# Patient Record
Sex: MALE | Race: WHITE | NOT HISPANIC OR LATINO | Employment: STUDENT | URBAN - METROPOLITAN AREA
[De-identification: names, ages, dates, MRNs, and addresses within clinical notes are randomized per-mention and may not be internally consistent; named-entity substitution may affect disease eponyms.]

---

## 2023-05-12 PROBLEM — J04.0 LARYNGITIS: Status: ACTIVE | Noted: 2023-05-12

## 2023-05-12 PROBLEM — J10.1 INFLUENZA DUE TO INFLUENZA VIRUS, TYPE B: Status: ACTIVE | Noted: 2023-05-12

## 2023-05-12 PROBLEM — J35.1 HYPERTROPHY OF TONSILS: Status: ACTIVE | Noted: 2023-05-12

## 2023-05-12 PROBLEM — G47.30 SLEEP DISORDER BREATHING: Status: ACTIVE | Noted: 2023-05-12

## 2023-05-12 PROBLEM — H61.21 IMPACTED CERUMEN OF RIGHT EAR: Status: ACTIVE | Noted: 2023-05-12

## 2023-05-12 PROBLEM — M89.8X1 PAIN IN SCAPULA: Status: ACTIVE | Noted: 2023-05-12

## 2023-05-12 PROBLEM — M89.8X6 MASS OF TIBIA: Status: ACTIVE | Noted: 2023-05-12

## 2023-05-12 PROBLEM — M54.50 LOW BACK PAIN: Status: ACTIVE | Noted: 2023-05-12

## 2023-05-12 PROBLEM — H66.001 NON-RECURRENT ACUTE SUPPURATIVE OTITIS MEDIA OF RIGHT EAR WITHOUT SPONTANEOUS RUPTURE OF TYMPANIC MEMBRANE: Status: ACTIVE | Noted: 2023-05-12

## 2023-05-12 PROBLEM — K21.00 GASTROESOPHAGEAL REFLUX DISEASE WITH ESOPHAGITIS: Status: ACTIVE | Noted: 2023-05-12

## 2023-05-12 PROBLEM — D49.89 NEOPLASM OF LEG: Status: ACTIVE | Noted: 2023-05-12

## 2023-05-12 PROBLEM — M62.838 MUSCLE SPASM: Status: ACTIVE | Noted: 2023-05-12

## 2023-05-12 PROBLEM — J03.90 ACUTE TONSILLITIS: Status: ACTIVE | Noted: 2023-05-12

## 2023-05-12 PROBLEM — R59.9 LYMPH NODE ENLARGEMENT: Status: ACTIVE | Noted: 2023-05-12

## 2023-05-12 PROBLEM — H65.02 ACUTE SEROUS OTITIS MEDIA OF LEFT EAR: Status: ACTIVE | Noted: 2023-05-12

## 2023-05-12 PROBLEM — E78.00 HYPERCHOLESTEROLEMIA: Status: ACTIVE | Noted: 2023-05-12

## 2023-05-12 PROBLEM — F40.10 SOCIAL ANXIETY DISORDER OF CHILDHOOD: Status: ACTIVE | Noted: 2023-05-12

## 2023-05-12 PROBLEM — H60.311 ACUTE DIFFUSE OTITIS EXTERNA OF RIGHT EAR: Status: ACTIVE | Noted: 2023-05-12

## 2023-05-12 PROBLEM — F90.0 ADHD, PREDOMINANTLY INATTENTIVE TYPE: Status: ACTIVE | Noted: 2023-05-12

## 2023-05-12 PROBLEM — R15.9 BOWEL INCONTINENCE: Status: ACTIVE | Noted: 2023-05-12

## 2023-05-12 PROBLEM — R63.5 ABNORMAL WEIGHT GAIN: Status: ACTIVE | Noted: 2023-05-12

## 2023-05-12 PROBLEM — R05.9 COUGH: Status: ACTIVE | Noted: 2023-05-12

## 2023-05-12 PROBLEM — G89.29 MID BACK PAIN, CHRONIC: Status: ACTIVE | Noted: 2023-05-12

## 2023-05-12 PROBLEM — G47.33 OBSTRUCTIVE SLEEP APNEA SYNDROME: Status: ACTIVE | Noted: 2023-05-12

## 2023-05-12 PROBLEM — R50.9 FEVER: Status: ACTIVE | Noted: 2023-05-12

## 2023-05-12 PROBLEM — J01.00 ACUTE NON-RECURRENT MAXILLARY SINUSITIS: Status: ACTIVE | Noted: 2023-05-12

## 2023-05-12 PROBLEM — J18.9 LLL PNEUMONIA: Status: ACTIVE | Noted: 2023-05-12

## 2023-05-12 PROBLEM — H66.009 ACUTE SUPPURATIVE OTITIS MEDIA: Status: ACTIVE | Noted: 2023-05-12

## 2023-05-12 PROBLEM — L01.02 PUSTULAR FOLLICULITIS: Status: ACTIVE | Noted: 2023-05-12

## 2023-05-12 PROBLEM — F41.1 GENERALIZED ANXIETY DISORDER: Status: ACTIVE | Noted: 2023-05-12

## 2023-05-12 PROBLEM — H91.92 HEARING LOSS OF LEFT EAR: Status: ACTIVE | Noted: 2023-05-12

## 2023-05-12 PROBLEM — M89.9 BONE LESION: Status: ACTIVE | Noted: 2023-05-12

## 2023-05-12 PROBLEM — R09.81 NASAL CONGESTION: Status: ACTIVE | Noted: 2023-05-12

## 2023-05-12 PROBLEM — M54.9 MID BACK PAIN, CHRONIC: Status: ACTIVE | Noted: 2023-05-12

## 2023-05-12 PROBLEM — R15.9 ENCOPRESIS WITH CONSTIPATION AND OVERFLOW INCONTINENCE: Status: ACTIVE | Noted: 2023-05-12

## 2023-05-12 PROBLEM — E78.01 FAMILIAL HYPERCHOLESTEREMIA: Status: ACTIVE | Noted: 2023-05-12

## 2023-05-12 PROBLEM — J02.9 PHARYNGITIS: Status: ACTIVE | Noted: 2023-05-12

## 2023-05-12 PROBLEM — R07.9 CHEST PAIN: Status: ACTIVE | Noted: 2023-05-12

## 2023-05-12 PROBLEM — G57.41: Status: ACTIVE | Noted: 2023-05-12

## 2023-05-12 PROBLEM — J30.9 ALLERGIC RHINITIS: Status: ACTIVE | Noted: 2023-05-12

## 2023-05-12 PROBLEM — F81.9 LEARNING DIFFICULTY: Status: ACTIVE | Noted: 2023-05-12

## 2023-05-12 RX ORDER — FLUTICASONE PROPIONATE 50 MCG
1 SPRAY, SUSPENSION (ML) NASAL DAILY
COMMUNITY
Start: 2018-10-11

## 2023-05-12 RX ORDER — AZITHROMYCIN 250 MG/1
TABLET, FILM COATED ORAL
COMMUNITY
Start: 2022-03-09

## 2023-05-12 RX ORDER — DEXTROAMPHETAMINE SACCHARATE, AMPHETAMINE ASPARTATE MONOHYDRATE, DEXTROAMPHETAMINE SULFATE AND AMPHETAMINE SULFATE 3.75; 3.75; 3.75; 3.75 MG/1; MG/1; MG/1; MG/1
CAPSULE, EXTENDED RELEASE ORAL
COMMUNITY
Start: 2019-04-01

## 2023-05-12 RX ORDER — SULFAMETHOXAZOLE AND TRIMETHOPRIM 800; 160 MG/1; MG/1
TABLET ORAL 2 TIMES DAILY
COMMUNITY
Start: 2020-07-02

## 2023-05-12 RX ORDER — AMOXICILLIN AND CLAVULANATE POTASSIUM 875; 125 MG/1; MG/1
1 TABLET, FILM COATED ORAL EVERY 12 HOURS
COMMUNITY
Start: 2022-03-11

## 2023-05-12 RX ORDER — CYCLOBENZAPRINE HCL 10 MG
1 TABLET ORAL NIGHTLY PRN
COMMUNITY
Start: 2020-06-11

## 2023-05-12 RX ORDER — CIPROFLOXACIN AND DEXAMETHASONE 3; 1 MG/ML; MG/ML
SUSPENSION/ DROPS AURICULAR (OTIC) 2 TIMES DAILY
COMMUNITY
Start: 2022-02-02

## 2023-05-22 ENCOUNTER — LAB (OUTPATIENT)
Dept: LAB | Facility: LAB | Age: 18
End: 2023-05-22
Payer: MEDICAID

## 2023-05-22 ENCOUNTER — OFFICE VISIT (OUTPATIENT)
Dept: PEDIATRICS | Facility: CLINIC | Age: 18
End: 2023-05-22
Payer: MEDICAID

## 2023-05-22 VITALS
DIASTOLIC BLOOD PRESSURE: 72 MMHG | WEIGHT: 210 LBS | HEIGHT: 70 IN | SYSTOLIC BLOOD PRESSURE: 124 MMHG | BODY MASS INDEX: 30.06 KG/M2

## 2023-05-22 DIAGNOSIS — Z00.129 HEALTH CHECK FOR CHILD OVER 28 DAYS OLD: ICD-10-CM

## 2023-05-22 DIAGNOSIS — R42 DIZZINESS: ICD-10-CM

## 2023-05-22 DIAGNOSIS — F64.9 GENDER DYSPHORIA: ICD-10-CM

## 2023-05-22 DIAGNOSIS — H53.9 VISUAL DISTURBANCE: ICD-10-CM

## 2023-05-22 DIAGNOSIS — R42 DIZZINESS: Primary | ICD-10-CM

## 2023-05-22 PROBLEM — F91.3 OPPOSITIONAL DEFIANT DISORDER: Status: ACTIVE | Noted: 2022-06-22

## 2023-05-22 PROBLEM — F90.2 ATTENTION-DEFICIT HYPERACTIVITY DISORDER, COMBINED TYPE: Status: ACTIVE | Noted: 2022-06-22

## 2023-05-22 PROBLEM — F93.0 SEPARATION ANXIETY DISORDER OF CHILDHOOD: Status: ACTIVE | Noted: 2022-06-22

## 2023-05-22 PROBLEM — F33.1 MODERATE EPISODE OF RECURRENT MAJOR DEPRESSIVE DISORDER (MULTI): Chronic | Status: ACTIVE | Noted: 2021-05-28

## 2023-05-22 PROBLEM — F40.10 SOCIAL PHOBIA, UNSPECIFIED: Status: ACTIVE | Noted: 2022-06-22

## 2023-05-22 PROBLEM — F98.1: Status: ACTIVE | Noted: 2022-06-22

## 2023-05-22 LAB
ALANINE AMINOTRANSFERASE (SGPT) (U/L) IN SER/PLAS: 151 U/L (ref 10–52)
ALBUMIN (G/DL) IN SER/PLAS: 5 G/DL (ref 3.4–5)
ALKALINE PHOSPHATASE (U/L) IN SER/PLAS: 54 U/L (ref 33–120)
ANION GAP IN SER/PLAS: 14 MMOL/L (ref 10–20)
ASPARTATE AMINOTRANSFERASE (SGOT) (U/L) IN SER/PLAS: 50 U/L (ref 9–39)
BASOPHILS (10*3/UL) IN BLOOD BY AUTOMATED COUNT: 0.03 X10E9/L (ref 0–0.1)
BASOPHILS/100 LEUKOCYTES IN BLOOD BY AUTOMATED COUNT: 0.4 % (ref 0–2)
BILIRUBIN TOTAL (MG/DL) IN SER/PLAS: 0.6 MG/DL (ref 0–1.2)
CALCIUM (MG/DL) IN SER/PLAS: 10.6 MG/DL (ref 8.6–10.6)
CARBON DIOXIDE, TOTAL (MMOL/L) IN SER/PLAS: 32 MMOL/L (ref 21–32)
CHLORIDE (MMOL/L) IN SER/PLAS: 101 MMOL/L (ref 98–107)
CREATININE (MG/DL) IN SER/PLAS: 0.86 MG/DL (ref 0.5–1.3)
EOSINOPHILS (10*3/UL) IN BLOOD BY AUTOMATED COUNT: 0.16 X10E9/L (ref 0–0.7)
EOSINOPHILS/100 LEUKOCYTES IN BLOOD BY AUTOMATED COUNT: 2.1 % (ref 0–6)
ERYTHROCYTE DISTRIBUTION WIDTH (RATIO) BY AUTOMATED COUNT: 12.3 % (ref 11.5–14.5)
ERYTHROCYTE MEAN CORPUSCULAR HEMOGLOBIN CONCENTRATION (G/DL) BY AUTOMATED: 33.5 G/DL (ref 32–36)
ERYTHROCYTE MEAN CORPUSCULAR VOLUME (FL) BY AUTOMATED COUNT: 90 FL (ref 80–100)
ERYTHROCYTES (10*6/UL) IN BLOOD BY AUTOMATED COUNT: 5.11 X10E12/L (ref 4.5–5.9)
GFR MALE: >90 ML/MIN/1.73M2
GLUCOSE (MG/DL) IN SER/PLAS: 109 MG/DL (ref 74–99)
HEMATOCRIT (%) IN BLOOD BY AUTOMATED COUNT: 46.2 % (ref 41–52)
HEMOGLOBIN (G/DL) IN BLOOD: 15.5 G/DL (ref 13.5–17.5)
IMMATURE GRANULOCYTES/100 LEUKOCYTES IN BLOOD BY AUTOMATED COUNT: 0.3 % (ref 0–0.9)
IRON (UG/DL) IN SER/PLAS: 101 UG/DL (ref 35–150)
IRON BINDING CAPACITY (UG/DL) IN SER/PLAS: 432 UG/DL (ref 240–445)
IRON SATURATION (%) IN SER/PLAS: 23 % (ref 25–45)
LEUKOCYTES (10*3/UL) IN BLOOD BY AUTOMATED COUNT: 7.5 X10E9/L (ref 4.4–11.3)
LYMPHOCYTES (10*3/UL) IN BLOOD BY AUTOMATED COUNT: 2.56 X10E9/L (ref 1.2–4.8)
LYMPHOCYTES/100 LEUKOCYTES IN BLOOD BY AUTOMATED COUNT: 34.3 % (ref 13–44)
MONOCYTES (10*3/UL) IN BLOOD BY AUTOMATED COUNT: 0.53 X10E9/L (ref 0.1–1)
MONOCYTES/100 LEUKOCYTES IN BLOOD BY AUTOMATED COUNT: 7.1 % (ref 2–10)
NEUTROPHILS (10*3/UL) IN BLOOD BY AUTOMATED COUNT: 4.16 X10E9/L (ref 1.2–7.7)
NEUTROPHILS/100 LEUKOCYTES IN BLOOD BY AUTOMATED COUNT: 55.8 % (ref 40–80)
NRBC (PER 100 WBCS) BY AUTOMATED COUNT: 0 /100 WBC (ref 0–0)
PLATELETS (10*3/UL) IN BLOOD AUTOMATED COUNT: 324 X10E9/L (ref 150–450)
POTASSIUM (MMOL/L) IN SER/PLAS: 4.2 MMOL/L (ref 3.5–5.3)
PROTEIN TOTAL: 7.4 G/DL (ref 6.4–8.2)
SODIUM (MMOL/L) IN SER/PLAS: 143 MMOL/L (ref 136–145)
THYROTROPIN (MIU/L) IN SER/PLAS BY DETECTION LIMIT <= 0.05 MIU/L: 0.96 MIU/L (ref 0.44–3.98)
UREA NITROGEN (MG/DL) IN SER/PLAS: 16 MG/DL (ref 6–23)

## 2023-05-22 PROCEDURE — 80053 COMPREHEN METABOLIC PANEL: CPT

## 2023-05-22 PROCEDURE — 99395 PREV VISIT EST AGE 18-39: CPT | Performed by: PEDIATRICS

## 2023-05-22 PROCEDURE — 85025 COMPLETE CBC W/AUTO DIFF WBC: CPT

## 2023-05-22 PROCEDURE — 83550 IRON BINDING TEST: CPT

## 2023-05-22 PROCEDURE — 36415 COLL VENOUS BLD VENIPUNCTURE: CPT

## 2023-05-22 PROCEDURE — 83540 ASSAY OF IRON: CPT

## 2023-05-22 PROCEDURE — 84443 ASSAY THYROID STIM HORMONE: CPT

## 2023-05-22 RX ORDER — TOBRAMYCIN AND DEXAMETHASONE 3; 1 MG/G; MG/G
OINTMENT OPHTHALMIC
COMMUNITY
Start: 2023-05-13

## 2023-05-22 RX ORDER — AZITHROMYCIN 250 MG/1
250 TABLET, FILM COATED ORAL
COMMUNITY

## 2023-05-22 SDOH — HEALTH STABILITY: MENTAL HEALTH: RISK FACTORS RELATED TO EMOTIONS: 1

## 2023-05-22 SDOH — SOCIAL STABILITY: SOCIAL INSECURITY: RISK FACTORS RELATED TO FRIENDS OR FAMILY: 0

## 2023-05-22 SDOH — HEALTH STABILITY: PHYSICAL HEALTH: RISK FACTORS RELATED TO DIET: 1

## 2023-05-22 SDOH — HEALTH STABILITY: MENTAL HEALTH: RISK FACTORS RELATED TO DRUGS: 0

## 2023-05-22 SDOH — SOCIAL STABILITY: SOCIAL INSECURITY: RISK FACTORS RELATED TO PERSONAL SAFETY: 0

## 2023-05-22 SDOH — SOCIAL STABILITY: SOCIAL INSECURITY: RISK FACTORS RELATED TO RELATIONSHIPS: 0

## 2023-05-22 SDOH — HEALTH STABILITY: MENTAL HEALTH: RISK FACTORS RELATED TO TOBACCO: 0

## 2023-05-22 SDOH — SOCIAL STABILITY: SOCIAL INSECURITY: RISK FACTORS AT SCHOOL: 0

## 2023-05-22 SDOH — HEALTH STABILITY: MENTAL HEALTH: SMOKING IN HOME: 0

## 2023-05-22 ASSESSMENT — ENCOUNTER SYMPTOMS
SNORING: 0
SLEEP DISTURBANCE: 0
AVERAGE SLEEP DURATION (HRS): 6
CONSTIPATION: 0
DIARRHEA: 0

## 2023-05-22 NOTE — PROGRESS NOTES
Subjective   History was provided by the  self .  Robert Waller is a 17 y.o. male who is here for this well child visit.  Immunization History   Administered Date(s) Administered    DTaP 2003, 2003, 02/02/2004, 02/07/2005, 06/06/2007    Hep A, ped/adol, 2 dose 08/13/2018, 08/17/2020    Hep B, Adolescent or Pediatric 2003, 2003, 06/05/2004    HiB, unspecified 2003, 2003, 03/16/2004, 08/25/2004    IPV 2003, 2003, 02/02/2004, 06/06/2007    Influenza, live, intranasal 12/28/2010, 11/18/2011    Influenza, seasonal, injectable, preservative free 02/07/2005, 11/20/2006, 11/12/2007, 10/02/2009, 01/09/2013, 11/11/2013    MMR 08/24/2004, 06/06/2007    Meningococcal MCV4P 12/02/2015    Novel Influenza-H1N1-09, nasal 11/10/2009, 12/15/2009    Pfizer Purple Cap SARS-CoV-2 06/19/2021, 07/27/2021    Pneumococcal Conjugate PCV 7 02/02/2004, 03/16/2004, 08/25/2004, 08/16/2005    Tdap 12/02/2015    Varicella 08/24/2004, 12/28/2010     History of previous adverse reactions to immunizations? no  The following portions of the patient's history were reviewed by a provider in this encounter and updated as appropriate:       Well Child Assessment:  Robert lives with his mother (Was living in [place] for 9 Months with his Girlfriend and her family. Now back home.). (He has social anxiety which impacts his education and work life.)     Nutrition  Food source: Poorly Balanced Diet.   Dental  The patient has a dental home. The patient brushes teeth regularly. Last dental exam was more than a year ago.   Elimination  Elimination problems do not include constipation, diarrhea or urinary symptoms. There is no bed wetting.   Behavioral  Behavioral issues include performing poorly at school. (Was attempting his GED but due to anxiety stopped it.) Disciplinary methods include consistency among caregivers and praising good behavior.   Sleep  Average sleep duration is 6 hours. The patient does not snore.  "There are no sleep problems.   Safety  There is no smoking in the home. Home has working smoke alarms? yes. Home has working carbon monoxide alarms? yes. There is no gun in home.   School  There are no signs of learning disabilities.   Screening  There are no risk factors for hearing loss. There are no risk factors for anemia. There are no risk factors for dyslipidemia. There are no risk factors for tuberculosis. There are risk factors for vision problems. There are risk factors related to diet. There are no risk factors at school. There are no risk factors for sexually transmitted infections. There are no risk factors related to alcohol. There are no risk factors related to relationships. There are no risk factors related to friends or family. There are risk factors related to emotions. There are no risk factors related to drugs. There are no risk factors related to personal safety. There are no risk factors related to tobacco.   Social  After school, the child is at home with a parent. Sibling interactions are good.     ROS: Visual Changes since seeing Ophthalmologist.   Left ear feels blocked.  He would like referral for Transgender Care.    Objective   Vitals:    05/22/23 0823   BP: 124/72   Weight: 95.3 kg (210 lb)   Height: 1.765 m (5' 9.5\")     Growth parameters are noted and are appropriate for age.  Physical Exam  Vitals and nursing note reviewed. Exam conducted with a chaperone present.   Constitutional:       Appearance: Normal appearance. He is normal weight.   HENT:      Head: Normocephalic and atraumatic.      Right Ear: Tympanic membrane, ear canal and external ear normal.      Left Ear: Tympanic membrane, ear canal and external ear normal.      Ears:      Comments: Cerumen bilaterally but can visualize both TMs     Nose: Nose normal.      Mouth/Throat:      Mouth: Mucous membranes are moist.      Pharynx: Oropharynx is clear.   Eyes:      Extraocular Movements: Extraocular movements intact.      " Conjunctiva/sclera: Conjunctivae normal.      Pupils: Pupils are equal, round, and reactive to light.   Cardiovascular:      Rate and Rhythm: Normal rate and regular rhythm.      Pulses: Normal pulses.      Heart sounds: Normal heart sounds.   Pulmonary:      Effort: Pulmonary effort is normal.      Breath sounds: Normal breath sounds.   Abdominal:      General: Abdomen is flat.      Palpations: Abdomen is soft.   Musculoskeletal:         General: Normal range of motion.      Cervical back: Normal range of motion and neck supple.   Skin:     General: Skin is warm and dry.      Capillary Refill: Capillary refill takes less than 2 seconds.   Neurological:      General: No focal deficit present.      Mental Status: He is alert and oriented to person, place, and time. Mental status is at baseline.   Psychiatric:         Mood and Affect: Mood normal.         Behavior: Behavior normal.         Thought Content: Thought content normal.         Judgment: Judgment normal.         Assessment/Plan   Well adolescent.  1. Anticipatory guidance discussed.  Gave handout on well-child issues at this age.  2.  Weight management:  The patient was counseled regarding nutrition and physical activity.  3. Development: appropriate for age  4.   Orders Placed This Encounter   Procedures    TSH with reflex to Free T4 if abnormal    CBC and Auto Differential    Comprehensive Metabolic Panel    Iron and TIBC    Referral to Ophthalmology   Also given referral for Transgender Care  5. Follow-up visit in 1 year for next well child visit, or sooner as needed.